# Patient Record
Sex: MALE | Race: WHITE | NOT HISPANIC OR LATINO | URBAN - METROPOLITAN AREA
[De-identification: names, ages, dates, MRNs, and addresses within clinical notes are randomized per-mention and may not be internally consistent; named-entity substitution may affect disease eponyms.]

---

## 2022-09-06 ENCOUNTER — HOSPITAL ENCOUNTER (OUTPATIENT)
Dept: WOUND CARE | Facility: HOSPITAL | Age: 52
Discharge: HOME | End: 2022-09-06
Attending: EMERGENCY MEDICINE
Payer: COMMERCIAL

## 2022-09-06 VITALS
TEMPERATURE: 98 F | WEIGHT: 208 LBS | HEART RATE: 82 BPM | DIASTOLIC BLOOD PRESSURE: 90 MMHG | BODY MASS INDEX: 29.12 KG/M2 | RESPIRATION RATE: 18 BRPM | HEIGHT: 71 IN | SYSTOLIC BLOOD PRESSURE: 134 MMHG

## 2022-09-06 DIAGNOSIS — L97.912 ULCER OF RIGHT LOWER EXTREMITY WITH FAT LAYER EXPOSED: Primary | ICD-10-CM

## 2022-09-06 DIAGNOSIS — M54.9 CHRONIC BACK PAIN, UNSPECIFIED BACK LOCATION, UNSPECIFIED BACK PAIN LATERALITY: ICD-10-CM

## 2022-09-06 DIAGNOSIS — R23.3 PETECHIAL RASH: ICD-10-CM

## 2022-09-06 DIAGNOSIS — Z88.6 HISTORY OF ALLERGY TO NSAID: ICD-10-CM

## 2022-09-06 DIAGNOSIS — G89.29 CHRONIC BACK PAIN, UNSPECIFIED BACK LOCATION, UNSPECIFIED BACK PAIN LATERALITY: ICD-10-CM

## 2022-09-06 PROCEDURE — 99203 OFFICE O/P NEW LOW 30 MIN: CPT

## 2022-09-06 PROCEDURE — 27000999 HC MEDICAL RECORD PHOTO DOCUMENTATION

## 2022-09-06 NOTE — PATIENT INSTRUCTIONS
Pt seen today by: Dr Mima Eli  Self care DRESSING INSTRUCTIONS:        Wound location: Right lower medial shin    Dressings to be changed daily and as needed if soiled or not intact.  Patient will be seen in this clinic on Tuesdays.      Cleanse wound with wound cleanser or saline  Apply Promogran ag over Santyl to the wound bed  Cover with mepilex bordered dressing          Return visit: Tuesday, September 20, 2022 at 3pm      Wound may have been debrided in clinic: if so, WHAT YOU NEED TO KNOW:    Debridement is the removal of infected, damaged, or dead tissue so a wound can heal properly. Your wound may need more than one debridement. Debridement can cause bleeding, and a small amount of blood is expected.  AFTER A DEBRIDEMENT:    Keep your wound clean and dry. Do not remove the dressing unless instructed.  Follow the wound care orders provided to you or your home health care provider.  If you have pain, take over the counter pain relievers or pain medication if prescribed.  Elevate the wound and limit excessive activity to prevent bleeding and/or swelling in your wound.  If you see blood coming through the dressing, apply gauze and tape over the dressing and hold firm pressure to the wound with your hand for 5-10 minutes continuously, without peeking, to help the bleeding stop.  Contact [facility] wound care team at [phone] or go to the nearest Emergency department if:    You have a fever greater than 101 taken by mouth.  Your pain gets worse or does not go away, even after taking your regular pain medicine.  Your skin around your wound is red, hot, swollen, or draining pus.  You have bleeding that continues to come through the dressing after holding pressure for 10 minutes       Compression: You may be using a compressive type of dressings to control edema: If so, keep your compression wrap or tubigrip in place. Do not get them wet, they should feel snug. If they feel tight, or cause pain in any way,   elevate your legs above your heart for 15 minutes. If the wraps still cause pain or you can not tolerate compression,  please remove and notify the clinic or your home health.     Nutrition:  The current daily value (%DV) for protein is 50 grams per day and is meant as a general goal for most people. Further increasing your dietary protein intake is very important for wound healing. Typically one needs over 100g of protein per day to help with wound healing needs.  If you are a dialysis patient or have problems with your kidneys, talk to your Nephrologist about how much protein you can take in with your condition.  Examples of high protein items that can be added to your diet include: eggs, chicken, red meats, almonds, cottage cheese, Greek yogurt, beans, and peanut butter.  Fortified protein bars, shakes and drinks can add 15-30 additional grams of protein per serving.   Also add:   1 daily general multivitamin   Toño : 1 packet twice daily   Vitamin C : 500mg twice daily   Zinc 220 mg daily  Vit D : once daily    Offloading   Offload your wound. This means to reduce pressure on and around the wound that reduces blood flow to the wound and prevents healing. Your wound care team will discuss specific ways for you to offload your specific wound. Common offloading strategies include:    Turn or reposition every 2 hours or sooner  Use pillows, wedges, ROHO wheelchair cushions or other special devices like boots and shoes to lift the wound off of hard surfaces  Alternating Low Air-loss (ALAL) mattress may be ordered  Padded dressings can reduce wound pressure        Call our [facility] wound clinic for questions/concerns a [phone] .

## 2022-09-06 NOTE — PROGRESS NOTES
Subjective:       Patient ID: Luc Urbina is a 51 y.o. male.    Chief Complaint: vasculitis    50 y/o WM presents to wound care clinic today on 9/6/22 to help treat a wound/ulcer on RLE.   Pt lives in [place], [place].   Pt with h/o back pain and had rx for celebrex after he had a back injection. Pt reports within 72 hrs of this new med for him, he developed a petechial rash on abdomen and lower body. He saw his provider, SHYLA Barber on 8/10/22 who noted BLE petechiae. Labs ordered: CBC/CMP: negative.  Pt saw her again on  8/29/22 who describes again the  'petechiae' on BLE as well as an ulcer/eschar on RLE: rx  topical mupirocin and oral low dose prednisone over a few days. Pt asked to be referred to this wound care clinic per recs of his neighbor. She also gave him a tube of santyl to apply on the ulcer.  He comes in today on 9/6/22. He has an ulcer on proximal anterior RLE which actually looks better than a prior picture of it : prior pic: wound appeared inflammed with a red hyperemic ring with center eschar covering. Much better now.  No fever/chills; no claudication or rest pain; just completed the prednisone;     Pmh: back pain  Psh: MACY  Sh: nonsmoker; nondrinker        Review of Systems   Constitutional: Negative.    HENT: Negative.     Eyes: Negative.    Respiratory: Negative.     Cardiovascular: Negative.    Gastrointestinal: Negative.    Skin:  Positive for rash. Wound: see hpi.  Neurological:  Negative for dizziness, tremors, seizures, syncope, facial asymmetry, speech difficulty, weakness, light-headedness and numbness. Headaches: typically in mornings.  Hematological: Negative.    Psychiatric/Behavioral: Negative.         Objective:      Vitals:    09/06/22 1026   BP: (!) 134/90   Pulse: 82   Resp: 18   Temp: 98.3 °F (36.8 °C)     @poctglucose@  No results for input(s): POCTGLUCOSE in the last 24 hours.  Physical Exam  Constitutional:       Appearance: Normal appearance.   HENT:      Head:  Normocephalic and atraumatic.      Nose: Nose normal.      Mouth/Throat:      Pharynx: Oropharynx is clear.   Eyes:      Conjunctiva/sclera: Conjunctivae normal.   Cardiovascular:      Pulses:           Popliteal pulses are 2+ on the right side and 2+ on the left side.        Dorsalis pedis pulses are 2+ on the right side and 2+ on the left side.      Comments: Hair on both legs to toes;   Pulmonary:      Effort: Pulmonary effort is normal.   Abdominal:      General: Abdomen is flat.   Musculoskeletal:         General: Normal range of motion.      Right lower leg: No edema.      Left lower leg: No edema.        Legs:       Comments: Can see faint round pigmentation changes on both legs which patient says was the petechial rash that now resolved   Skin:     General: Skin is warm and dry.      Capillary Refill: Capillary refill takes less than 2 seconds.   Neurological:      General: No focal deficit present.      Mental Status: He is alert and oriented to person, place, and time. Mental status is at baseline.   Psychiatric:         Mood and Affect: Mood normal.            Altered Skin Integrity 09/06/22 1041 Right anterior;lower;medial Leg #1 Ulceration (Active)   09/06/22 1041   Altered Skin Integrity Present on Admission: yes   Side: Right   Orientation: anterior;lower;medial   Location: Leg   Wound Number: #1   Is this injury device related?: No   Primary Wound Type: Ulceration   Description of Altered Skin Integrity:    Ankle-Brachial Index: done 9/6/22 right pt 1.14  right dp 1.10     left pt 1.13  left dp 1.10   Pulses: palpable monophasic doppler   Removal Indication and Assessment:    Wound Outcome:    (Retired) Wound Length (cm):    (Retired) Wound Width (cm):    (Retired) Depth (cm):    Wound Description (Comments):    Removal Indications:    Wound Image   09/06/22 1040   Dressing Appearance Saturated 09/06/22 1040   Drainage Amount Moderate 09/06/22 1040   Drainage Characteristics/Odor  Serosanguineous;Yellow 09/06/22 1040   Appearance Pink;Yellow 09/06/22 1040   Tissue loss description Partial thickness 09/06/22 1040   Black (%), Wound Tissue Color 0 % 09/06/22 1040   Red (%), Wound Tissue Color 80 % 09/06/22 1040   Yellow (%), Wound Tissue Color 20 % 09/06/22 1040   Periwound Area Intact 09/06/22 1040   Wound Edges Defined 09/06/22 1040   Wound Length (cm) 1.1 cm 09/06/22 1040   Wound Width (cm) 1 cm 09/06/22 1040   Wound Depth (cm) 0.1 cm 09/06/22 1040   Wound Volume (cm^3) 0.11 cm^3 09/06/22 1040   Wound Surface Area (cm^2) 1.1 cm^2 09/06/22 1040   Care Cleansed with:;Wound cleanser 09/06/22 1040           Assessment:       1. Ulcer of right lower extremity with fat layer exposed    2. Petechial rash    3. History of allergy to NSAID    4. Chronic back pain, unspecified back location, unspecified back pain laterality            LABS from outside facility on 8/10/22  WBC10.4  H/H 15/45  [id number]  BUN/cR: 17/1.05  Glucose: 105  Plan:     Plan of Care:    Records reviewed and pt examined  The wound on RLE looks much better now; the previously reported petechial rash has resolved; I presume it was the celebrex that caused this reaction and the prednisone prescribed really helped it resolve. We will put Celebrex as an allergy for him  Can continue the santyl since he has it; and can place collagen dressing on it  Nutrition: Must have a high protein diet to support wound  healing; (this should be over 100g protein /day (if no kidney issues); Also rec MVI along with vit C, vit D, zinc and Toño  Return to clinic 2 weeks for recheck           The time spent including preparing to see the patient, obtaining patient history and assessment, evaluation of the plan of care, patient/caregiver counseling and education, orders, documentation, coordination of care, and other professional medical management activities for today's encounter was 30 minutes.